# Patient Record
Sex: FEMALE | URBAN - METROPOLITAN AREA
[De-identification: names, ages, dates, MRNs, and addresses within clinical notes are randomized per-mention and may not be internally consistent; named-entity substitution may affect disease eponyms.]

---

## 2019-12-09 ENCOUNTER — HOSPITAL ENCOUNTER (OUTPATIENT)
Dept: MEDSURG UNIT | Facility: HOSPITAL | Age: 34
End: 2019-12-13
Attending: INTERNAL MEDICINE | Admitting: INTERNAL MEDICINE

## 2019-12-09 LAB
ABS NEUT (OLG): 2.58 X10(3)/MCL (ref 2.1–9.2)
ALBUMIN SERPL-MCNC: 3.7 GM/DL (ref 3.4–5)
ALBUMIN/GLOB SERPL: 0.9 RATIO (ref 1–2)
ALP SERPL-CCNC: 82 UNIT/L (ref 45–117)
ALT SERPL-CCNC: 16 UNIT/L (ref 13–56)
APPEARANCE, UA: CLEAR
AST SERPL-CCNC: 19 UNIT/L (ref 15–37)
B-HCG SERPL QL: NEGATIVE
BASOPHILS # BLD AUTO: 0.01 X10(3)/MCL (ref 0–0.2)
BASOPHILS NFR BLD AUTO: 0.2 % (ref 0–1)
BILIRUB SERPL-MCNC: 0.4 MG/DL (ref 0.2–1)
BILIRUB UR QL STRIP: NEGATIVE
BILIRUBIN DIRECT+TOT PNL SERPL-MCNC: 0.11 MG/DL (ref 0–0.2)
BILIRUBIN DIRECT+TOT PNL SERPL-MCNC: 0.29 MG/DL (ref 0–1)
BUN SERPL-MCNC: 13 MG/DL (ref 7–18)
CALCIUM SERPL-MCNC: 8.5 MG/DL (ref 8.5–10.1)
CHLORIDE SERPL-SCNC: 110 MMOL/L (ref 98–107)
CO2 SERPL-SCNC: 24 MMOL/L (ref 21–32)
COLOR UR: YELLOW
CREAT SERPL-MCNC: 0.68 MG/DL (ref 0.55–1.02)
EOSINOPHIL # BLD AUTO: 0.01 X10(3)/MCL (ref 0–0.9)
EOSINOPHIL NFR BLD AUTO: 0.2 % (ref 0–6.4)
ERYTHROCYTE [DISTWIDTH] IN BLOOD BY AUTOMATED COUNT: 12.2 % (ref 11.5–17)
GLOBULIN SER-MCNC: 4 GM/DL (ref 2–4)
GLUCOSE (UA): NEGATIVE
GLUCOSE SERPL-MCNC: 83 MG/DL (ref 74–106)
HCT VFR BLD AUTO: 34 % (ref 37–47)
HGB BLD-MCNC: 12 GM/DL (ref 12–16)
HGB UR QL STRIP: NEGATIVE
IMM GRANULOCYTES # BLD AUTO: 0.01 10*3/UL (ref 0–0.02)
IMM GRANULOCYTES NFR BLD AUTO: 0.2 % (ref 0–0.43)
KETONES UR QL STRIP: NEGATIVE
LEUKOCYTE ESTERASE UR QL STRIP: NEGATIVE
LYMPHOCYTES # BLD AUTO: 1.04 X10(3)/MCL (ref 0.6–4.6)
LYMPHOCYTES NFR BLD AUTO: 25.7 % (ref 16–44)
MCH RBC QN AUTO: 29.1 PG (ref 27–31)
MCHC RBC AUTO-ENTMCNC: 35.3 GM/DL (ref 33–36)
MCV RBC AUTO: 82.5 FL (ref 80–94)
MONOCYTES # BLD AUTO: 0.4 X10(3)/MCL (ref 0.1–1.3)
MONOCYTES NFR BLD AUTO: 9.9 % (ref 4–12.1)
NEUTROPHILS # BLD AUTO: 2.58 X10(3)/MCL (ref 2.1–9.2)
NEUTROPHILS NFR BLD AUTO: 63.8 % (ref 43–73)
NITRITE UR QL STRIP: NEGATIVE
NRBC BLD AUTO-RTO: 0 % (ref 0–0.2)
PH UR STRIP: 7 [PH] (ref 5–7)
PLATELET # BLD AUTO: 168 X10(3)/MCL (ref 130–400)
PMV BLD AUTO: 10.4 FL (ref 7.4–10.4)
POTASSIUM SERPL-SCNC: 3.2 MMOL/L (ref 3.5–5.1)
PROT SERPL-MCNC: 7.4 GM/DL (ref 6.4–8.2)
PROT UR QL STRIP: NEGATIVE
RBC # BLD AUTO: 4.12 X10(6)/MCL (ref 4.2–5.4)
SODIUM SERPL-SCNC: 140 MMOL/L (ref 136–145)
SP GR UR STRIP: 1.02 (ref 1–1.03)
UROBILINOGEN UR STRIP-ACNC: NEGATIVE
WBC # SPEC AUTO: 4 X10(3)/MCL (ref 4.5–11.5)

## 2019-12-10 LAB
ABS NEUT (OLG): 2.01 X10(3)/MCL (ref 2.1–9.2)
ALBUMIN SERPL-MCNC: 3.6 GM/DL (ref 3.4–5)
ALBUMIN/GLOB SERPL: 1.3 {RATIO}
ALP SERPL-CCNC: 73 UNIT/L (ref 38–126)
ALT SERPL-CCNC: 13 UNIT/L (ref 12–78)
AST SERPL-CCNC: 10 UNIT/L (ref 15–37)
BASOPHILS NFR BLD AUTO: 1 %
BILIRUB SERPL-MCNC: 0.4 MG/DL (ref 0.2–1)
BILIRUBIN DIRECT+TOT PNL SERPL-MCNC: 0.1 MG/DL (ref 0–0.2)
BILIRUBIN DIRECT+TOT PNL SERPL-MCNC: 0.3 MG/DL (ref 0–0.8)
BUN SERPL-MCNC: 6 MG/DL (ref 7–18)
CALCIUM SERPL-MCNC: 8.7 MG/DL (ref 8.5–10.1)
CHLORIDE SERPL-SCNC: 110 MMOL/L (ref 98–107)
CO2 SERPL-SCNC: 25 MMOL/L (ref 21–32)
CREAT SERPL-MCNC: 0.5 MG/DL (ref 0.55–1.02)
CRP SERPL HS-MCNC: 0.67 MG/L (ref 0–3)
EOSINOPHIL NFR BLD AUTO: 2 %
ERYTHROCYTE [DISTWIDTH] IN BLOOD BY AUTOMATED COUNT: 12.3 % (ref 11.5–17)
ERYTHROCYTE [SEDIMENTATION RATE] IN BLOOD: 10 MM/HR (ref 0–20)
GLOBULIN SER-MCNC: 2.7 GM/DL (ref 2.4–3.5)
GLUCOSE SERPL-MCNC: 80 MG/DL (ref 74–106)
HCT VFR BLD AUTO: 35.1 % (ref 37–47)
HGB BLD-MCNC: 11.5 GM/DL (ref 12–16)
LIPASE SERPL-CCNC: 57 UNIT/L (ref 73–393)
LYMPHOCYTES NFR BLD AUTO: 26 %
MCH RBC QN AUTO: 28.6 PG (ref 27–31)
MCHC RBC AUTO-ENTMCNC: 32.8 GM/DL (ref 33–36)
MCV RBC AUTO: 87.3 FL (ref 80–94)
MONOCYTES NFR BLD AUTO: 9 %
NEUTROPHILS NFR BLD AUTO: 62 %
PLATELET # BLD AUTO: 171 X10(3)/MCL (ref 130–400)
PMV BLD AUTO: 10.8 FL (ref 7.4–10.4)
POTASSIUM SERPL-SCNC: 4.2 MMOL/L (ref 3.5–5.1)
PROT SERPL-MCNC: 6.3 GM/DL (ref 6.4–8.2)
RBC # BLD AUTO: 4.02 X10(6)/MCL (ref 4.2–5.4)
SODIUM SERPL-SCNC: 140 MMOL/L (ref 136–145)
WBC # SPEC AUTO: 3.2 X10(3)/MCL (ref 4.5–11.5)

## 2019-12-11 LAB
D DIMER PPP IA.FEU-MCNC: 1996 NG/ML FEU (ref 0–500)
TROPONIN I SERPL-MCNC: <0.02 NG/ML (ref 0.02–0.49)

## 2019-12-12 LAB
ABS NEUT (OLG): 2.43 X10(3)/MCL (ref 2.1–9.2)
ALBUMIN SERPL-MCNC: 2.9 GM/DL (ref 3.4–5)
ALBUMIN/GLOB SERPL: 1.1 RATIO (ref 1.1–2)
ALP SERPL-CCNC: 111 UNIT/L (ref 38–126)
ALT SERPL-CCNC: 172 UNIT/L (ref 12–78)
AST SERPL-CCNC: 276 UNIT/L (ref 15–37)
BASOPHILS # BLD AUTO: 0 X10(3)/MCL (ref 0–0.2)
BASOPHILS NFR BLD AUTO: 0 %
BILIRUB SERPL-MCNC: 0.4 MG/DL (ref 0.2–1)
BILIRUBIN DIRECT+TOT PNL SERPL-MCNC: 0.2 MG/DL (ref 0–0.5)
BILIRUBIN DIRECT+TOT PNL SERPL-MCNC: 0.2 MG/DL (ref 0–0.8)
BUN SERPL-MCNC: 4 MG/DL (ref 7–18)
CALCIUM SERPL-MCNC: 8.2 MG/DL (ref 8.5–10.1)
CHLORIDE SERPL-SCNC: 111 MMOL/L (ref 98–107)
CO2 SERPL-SCNC: 24 MMOL/L (ref 21–32)
CREAT SERPL-MCNC: 0.42 MG/DL (ref 0.55–1.02)
EOSINOPHIL # BLD AUTO: 0.1 X10(3)/MCL (ref 0–0.9)
EOSINOPHIL NFR BLD AUTO: 2 %
ERYTHROCYTE [DISTWIDTH] IN BLOOD BY AUTOMATED COUNT: 12.2 % (ref 11.5–17)
GLOBULIN SER-MCNC: 2.7 GM/DL (ref 2.4–3.5)
GLUCOSE SERPL-MCNC: 99 MG/DL (ref 74–106)
HCT VFR BLD AUTO: 30 % (ref 37–47)
HGB BLD-MCNC: 10.2 GM/DL (ref 12–16)
LYMPHOCYTES # BLD AUTO: 0.8 X10(3)/MCL (ref 0.6–4.6)
LYMPHOCYTES NFR BLD AUTO: 21 %
MCH RBC QN AUTO: 29.4 PG (ref 27–31)
MCHC RBC AUTO-ENTMCNC: 34 GM/DL (ref 33–36)
MCV RBC AUTO: 86.5 FL (ref 80–94)
MONOCYTES # BLD AUTO: 0.4 X10(3)/MCL (ref 0.1–1.3)
MONOCYTES NFR BLD AUTO: 10 %
NEUTROPHILS # BLD AUTO: 2.43 X10(3)/MCL (ref 2.1–9.2)
NEUTROPHILS NFR BLD AUTO: 67 %
PLATELET # BLD AUTO: 141 X10(3)/MCL (ref 130–400)
PMV BLD AUTO: 10.7 FL (ref 9.4–12.4)
POTASSIUM SERPL-SCNC: 3.6 MMOL/L (ref 3.5–5.1)
PROT SERPL-MCNC: 5.6 GM/DL (ref 6.4–8.2)
RBC # BLD AUTO: 3.47 X10(6)/MCL (ref 4.2–5.4)
SODIUM SERPL-SCNC: 142 MMOL/L (ref 136–145)
WBC # SPEC AUTO: 3.6 X10(3)/MCL (ref 4.5–11.5)

## 2019-12-13 LAB
ALBUMIN SERPL-MCNC: 2.9 GM/DL (ref 3.4–5)
ALP SERPL-CCNC: 111 UNIT/L (ref 38–126)
ALT SERPL-CCNC: 135 UNIT/L (ref 12–78)
AST SERPL-CCNC: 120 UNIT/L (ref 15–37)
BILIRUB SERPL-MCNC: 0.4 MG/DL (ref 0.2–1)
BILIRUBIN DIRECT+TOT PNL SERPL-MCNC: 0.1 MG/DL (ref 0–0.5)
BILIRUBIN DIRECT+TOT PNL SERPL-MCNC: 0.3 MG/DL (ref 0–0.8)
LIVER PROFILE INTERP: ABNORMAL
PROT SERPL-MCNC: 5.8 GM/DL (ref 6.4–8.2)

## 2022-04-11 ENCOUNTER — HISTORICAL (OUTPATIENT)
Dept: ADMINISTRATIVE | Facility: HOSPITAL | Age: 37
End: 2022-04-11

## 2022-04-27 VITALS
DIASTOLIC BLOOD PRESSURE: 61 MMHG | HEIGHT: 59 IN | WEIGHT: 95.44 LBS | SYSTOLIC BLOOD PRESSURE: 97 MMHG | BODY MASS INDEX: 19.24 KG/M2

## 2022-04-30 NOTE — ED PROVIDER NOTES
Patient:   Mayra Zhang             MRN: [id number]            FIN: [id number]               Age:   34 years     Sex:  Female     :  1985   Associated Diagnoses:   Right flank pain   Author:   Tl Marte MD      Basic Information   History source: Patient.   Arrival mode: Private vehicle.   History limitation: Language barrier.   Additional information: Chief Complaint from Nursing Triage Note : Chief Complaint   2019 8:35 CST       Chief Complaint           pt c/o pain to rlq that radiates to back onset one month ago.  .   Provider/Visit info:   Time Seen:  Tl Marte MD / 2019 08:45  .   History of Present Illness   The patient presents with abdominal pain.  The onset was 4  weeks ago.  The course/duration of symptoms is worsening and fluctuating in intensity.  The character of symptoms is crampy.  The degree at onset was moderate.  The Location of pain at onset was right and flank.  The degree at present is severe.  The Location of pain at present is right and flank.  Radiating pain: none. The exacerbating factor is none.  The relieving factor is none.  Therapy today: none.  Risk factors consist of none.  Associated symptoms: nausea and vomiting.  Additional history: feels similar to prior kidney stones.        Review of Systems   Constitutional symptoms:  Negative except as documented in HPI.   Skin symptoms:  Negative except as documented in HPI.   Eye symptoms:  Negative except as documented in HPI.   ENMT symptoms:  Negative except as documented in HPI.   Respiratory symptoms:  Negative except as documented in HPI.   Cardiovascular symptoms:  Negative except as documented in HPI.   Gastrointestinal symptoms:  Negative except as documented in HPI.   Genitourinary symptoms:  Negative except as documented in HPI.   Musculoskeletal symptoms:  Negative except as documented in HPI.   Neurologic symptoms:  Negative except as documented in HPI.   Psychiatric  symptoms:  Negative except as documented in HPI.   Endocrine symptoms:  Negative except as documented in HPI.   Hematologic/Lymphatic symptoms:  Negative except as documented in HPI.   Allergy/immunologic symptoms:  Negative except as documented in HPI.             Additional review of systems information: All other systems reviewed and otherwise negative.      Health Status   Allergies:    Allergic Reactions (Selected)  No Known Medication Allergies,    Allergies (1) Active Reaction  No Known Medication Allergies None Documented  .   Menstrual history: Last menstrual period: Date 11/3/2019, irregular.      Past Medical/ Family/ Social History   Medical history:    Active  Nephrolithiasis ([id number]).   Surgical history:    No active procedure history items have been selected or recorded., surgery for kidney stones, unspecified hip surgery for congenital abnormality.   Family history: Not significant.   Social history:    Social & Psychosocial Habits    Tobacco  12/09/2019  Use: Never (less than 100 in l    Patient Wants Consult For Cessation Counseling N/A    Abuse/Neglect  12/09/2019  SHX Any signs of abuse or neglect No  .   Problem list:    No qualifying data available  .      Physical Examination               Vital Signs   Vital Signs   12/9/2019 8:35 CST       Temperature Temporal Artery               36.3 DegC                             Peripheral Pulse Rate     92 bpm                             Respiratory Rate          18 br/min                             SpO2                      99 %                             Oxygen Therapy            Room air                             Systolic Blood Pressure   105 mmHg                             Diastolic Blood Pressure  71 mmHg  .   Measurements   12/9/2019 8:35 CST       Weight Dosing             42 kg                             Weight Measured and Calculated in Lbs     92.59 lb                             Weight Estimated          42 kg                              Height/Length Dosing      144.78 cm                             Height/Length Estimated   144.78 cm                             Body Mass Index Estimated 20.04 kg/m2  .   Basic Oxygen Information   12/9/2019 8:35 CST       SpO2                      99 %                             Oxygen Therapy            Room air  .   General:  Alert, no acute distress.    Skin:  Warm, dry, pink.    Head:  Normocephalic, atraumatic.    Neck:  Supple, trachea midline, no tenderness, no JVD, no carotid bruit.    Eye:  Pupils are equal, round and reactive to light, extraocular movements are intact, normal conjunctiva, vision unchanged.    Ears, nose, mouth and throat:  Tympanic membranes clear, oral mucosa moist, no pharyngeal erythema or exudate.    Cardiovascular:  Regular rate and rhythm, No murmur, Normal peripheral perfusion, No edema.    Respiratory:  Lungs are clear to auscultation, respirations are non-labored, breath sounds are equal, Symmetrical chest wall expansion.    Chest wall:  No tenderness, No deformity.    Back:  Nontender, Normal range of motion, Normal alignment.    Musculoskeletal:  Normal ROM, normal strength, no tenderness, no swelling, no deformity.    Gastrointestinal:  Soft, Non distended, No organomegaly, Tenderness: Moderate, right flank, Guarding: Moderate, voluntary, Rebound: Negative, Bowel sounds: Normal.    Neurological:  Alert and oriented to person, place, time, and situation, No focal neurological deficit observed, CN II-XII intact, normal sensory observed, normal motor observed, normal speech observed, normal coordination observed.    Lymphatics:  No lymphadenopathy.   Psychiatric:  Cooperative, appropriate mood & affect, normal judgment.       Medical Decision Making   Orders  Launch Orders   Laboratory:  CMP (Order Processing): Stat collect, 12/9/2019 8:59 CST, Blood, Once, Stop date 12/9/2019 8:59 CST, Lab Collect, Print Label By Order Location, 12/9/2019 8:59 CST  Urinalysis with  Microscopic if Indicated (Order Processing): Stat collect, Urine, 12/9/2019 8:59 CST, Nurse collect  Pregnancy Test Urine (Order Processing): Stat collect, Urine, 12/9/2019 8:59 CST, Once, Stop date 12/9/2019 8:59 CST, Nurse collect, Print Label By Order Location, 12/9/2019 8:59 CST  CBC w/ Auto Diff (Order Processing): Stat collect, 12/9/2019 8:58 CST, Blood, Once, Stop date 12/9/2019 8:58 CST, Lab Collect, 12/9/2019 8:58 CST  Pharmacy:  Zofran 2 mg/mL injectable solution (Order Processing): 4 mg, form: Injection, IV Push, Once, first dose 12/9/2019 8:59 CST, stop date 12/9/2019 8:59 CST, STAT  Sodium Chloride 0.9% 1000mL 1,000 mL (Order Processing): 1,000 mL, 1,000 mL, IV, Bolus, start date 12/9/2019 8:58 CST, 1.3, m2  ketorolac 30 mg for IV Push (Order Processing): 30 mg, form: Injection, IV Push, Once, first dose 12/9/2019 8:58 CST, stop date 12/9/2019 8:58 CST, STAT  Radiology:  CT Abdomen and Pelvis W/O Contrast (Order Processing): Stat, 12/9/2019 8:58 CST, R/O Stones, None, Stretcher, Rad Type, Schedule this test.   Results review:  Lab results : Lab View   12/9/2019 9:30 CST       Sodium Lvl                140 mmol/L                             Potassium Lvl             3.2 mmol/L  LOW                             Chloride                  110 mmol/L  HI                             CO2                       24.0 mmol/L                             Calcium Lvl               8.5 mg/dL                             Glucose Lvl               83 mg/dL                             BUN                       13.0 mg/dL                             Creatinine                0.68 mg/dL                             eGFR-AA                   >60 mL/min/1.73 m2  NA                             eGFR-CHIP                  >60 mL/min/1.73 m2  NA                             Bili Total                0.4 mg/dL                             Bili Direct               0.11 mg/dL                             Bili Indirect             0.29  mg/dL                             AST                       19 unit/L                             ALT                       16 unit/L                             Alk Phos                  82 unit/L                             Total Protein             7.4 gm/dL                             Albumin Lvl               3.7 gm/dL                             Globulin                  4 gm/dL                             A/G Ratio                 0.9 ratio  LOW                             WBC                       4.0 x10(3)/mcL  LOW                             RBC                       4.12 x10(6)/mcL  LOW                             Hgb                       12.0 gm/dL                             Hct                       34.0 %  LOW                             Platelet                  168 x10(3)/mcL                             MCV                       82.5 fL                             MCH                       29.1 pg                             MCHC                      35.3 gm/dL                             RDW                       12.2 %                             MPV                       10.4 fL                             Abs Neut                  2.58 x10(3)/mcL                             Neutro Auto               63.8 %                             Lymph Auto                25.7 %                             Mono Auto                 9.9 %                             Eos Auto                  0.2 %                             Abs Eos                   0.01 x10(3)/mcL                             Basophil Auto             0.2 %                             Abs Neutro                2.58 x10(3)/mcL                             Abs Lymph                 1.04 x10(3)/mcL                             Abs Mono                  0.40 x10(3)/mcL                             Abs Baso                  0.01 x10(3)/mcL                             NRBC%                     0.0 %                             IG%                        0.200 %                             IG#                       0.0100    12/9/2019 8:45 CST       U Beta hCG Ql             Negative                             UA Appear                 CLEAR                             UA Color                  YELLOW                             UA Spec Grav              1.020                             UA Bili                   Negative                             UA pH                     7.0                             UA Urobilinogen           Negative                             UA Blood                  Negative                             UA Glucose                Negative                             UA Ketones                Negative                             UA Protein                Negative                             UA Nitrite                Negative                             UA Leuk Est               Negative  .   Radiology results:  Rad Results (ST)  < 12 hrs   Accession: [id number]  Order: US Abdomen Limited  Report Dt/Tm: 12/09/2019 11:09  Report:   EXAMINATION  US Abdomen Limited     TECHNIQUE  Multiplanar grayscale sonographic evaluation of the right upper  quadrant was performed, with focused Doppler assessment of the main  portal vein.     INDICATION  RUQ Pain     Comparison: There are no available comparisons at the time of exam  interpretation.     FINDINGS  Exam quality: adequate     PANCREAS  No focal abnormality or peripancreatic collection appreciated within  the inherent limitations of sonographic assessment.     LIVER       Size: Within normal limits.       Contour: Unremarkable.       Lesions: No focal abnormality.       Background echogenicity: Unremarkable.     BILE DUCTS       Intrahepatic biliary tree: No abnormal dilatation.       Extrahepatic ducts: Normal caliber through the visualized  proximal and mid portions, with no evidence of intraluminal  abnormality or cause for extrinsic compression.     GALLBLADDER       Lumen: Mildly  distended, with questionable echogenic sludge or  poorly shadowing stones at the gallbladder neck.       Wall: No thickening.       Pericholecystic fluid: None appreciated.          Sonographic Kat sign: Positive.     RIGHT KIDNEY  Shadowing nephrolithiasis is noted. There is no evidence of focal mass  or obstructive uropathy.     VASCULATURE       Portal vein: Normal hepatopedal flow.          Aorta: Unremarkable.       IVC: Unremarkable.     FLUID  No free fluid identified.     IMPRESSION  1.  Questionable gallbladder sludge versus small cholelithiasis.  2.  The positive sonographic Kat's sign may represent sequela of  acute cholecystitis, needing close consideration within the full  clinical context.  3.  Nonobstructing right nephrolithiasis.      Accession: [id number]  Order: CT Abdomen and Pelvis W/O Contrast  Report Dt/Tm: 12/09/2019 10:02  Report:   CT ABDOMEN AND PELVIS WITHOUT CONTRAST:  2-D reformations provided     HISTORY: R/O Stones          As per PQRS measures, all CT scans at this facility used dose  modulation, iterative reconstruction, and/or weight based dose  adjustment when appropriate to reduce radiation dose to as low as  reasonably achievable.     FINDINGS: Kidneys are normal size without hydronephrosis and with  calyceal region calcified nephrolithiasis bilateral ranging in size  punctate up to 5 mm. No hydroureter or additional sizable calcified  urolith identified. Left hip hardware artifact obscures evaluation of  the pelvis with unremarkable urinary bladder as visualized. Right  adnexal cyst likely approximately 2 to 3 cm     Appendix is normal caliber without inflammatory change. No dilated  bowel.     The unenhanced liver, pancreas spleen and adrenal glands demonstrate  no focal or acute pathology as visualized on unenhanced imaging.     IMPRESSION:  Nonobstructive nephrolithiasis      .      Reexamination/ Reevaluation   Vital signs   Basic Oxygen Information   12/9/2019 8:35  CST       SpO2                      99 %                             Oxygen Therapy            Room air        Impression and Plan   Diagnosis   Right flank pain ([id number] R10.9)      Calls-Consults   -  12/9/2019 13:27:00 , Jules DISLA, mariella Parikh, phone call, recommends admit to [facility], surgery consult.    Plan   Condition: Stable.    Disposition: Admit time  12/9/2019 13:28:00, Place in Observation Unit.    Counseled: Patient, Regarding diagnosis, Regarding diagnostic results, Regarding treatment plan, Regarding prescription, Patient indicated understanding of instructions.

## 2022-04-30 NOTE — OP NOTE
Patient:   Mayra Zhang             MRN: [id number]            FIN: [id number]               Age:   34 years     Sex:  Female     :  1985   Associated Diagnoses:   None   Author:   Lynnette Velazquez MD      Brief Operative Note   Operative Information   Date/ Time:  2019 08:33:00.     Preoperative Diagnosis: Acute Cholecystitis.     Postoperative Diagnosis: same.     Procedures Performed: Laparoscopic Cholecystectomy.     Surgeon: Lynnette Velazquez MD.     Assistant: Mary De Gumzan MD Removed: gallbladder.     Esimated blood loss: loss  20  cc.     Description of Procedure/Findings/    Complications: After informed consent was obtained, the patient was taken to the operative suite and general endotracheal anesthesia was induced. The abdomen was prepped and draped in the normal sterile fashion and a time out was called confirming patient and procedure. A 5mm incision was made just above the umbilicus, a veres needle was used to establish peritoneal insufflation at Hurtado's point and an Optiview trocar was used to place the camera into the peritoneum. Once the abdomen was insufflated to 15mmHg, under direct visualization, an 11mm trocar was placed in the epigastric region as well as 2 5mm trocars in the right subcostal region. Using graspers to elevate the fundus of the gallbladder over the liver, the cystic duct and cystic artery were identified and a critical view was obtained. I then doubly clipped and ligated the artery and duct. The gallbladder was then removed from the hepatic bed using hook cautery and removed from the abdomen throught the epigastric 11 mm port site. We inspected our clips, they remained in good position, ensured hemostasis, and removed our trocars under direct visualization. The 11mm port site fascia was closed with 0 Vicryl and the skin of the ports were closed with 4-0 vicryl. Sterile dressings were applied. All sponge and needle counts were  correct at the completion of the procedure. The patient was awakened from general anesthesia, extubated, and taken to the PACU in stable condition. .

## 2022-04-30 NOTE — CONSULTS
Patient:   Mayra Zhang             MRN: [id number]            FIN: [id number]               Age:   34 years     Sex:  Female     :  1985   Associated Diagnoses:   None   Author:   Stuart Alvarenga MD      Consultation Information   RUQ abdominal pain      Basic Information   Admit information:  This is a 34-year-old female with medical history of SLE and migraine headache presented to the ER with chief complaint right upper quadrant abdominal pain associated with nausea and vomiting that has been ongoing for the past 1 month.  Report pain is worse after eating.  He has been using ibuprofen with minimal relief.  Over the last 3 days the pain progressed and was unable to eat or drink without vomiting.  Reports having chills but denies fever.     On arrival to ED, she was hemodynamically stable and afebrile. Labs unremarkable besides K3.2, WBC 4.  CT abdomen and pelvis with contrast show nonobstructive bilateral nephrolithiasis.  Ultrasound abdomen showed mildly distended bladder with questionable echogenic sludge or poorly shadowing stone at the gallbladder neck, with no pericholecystic fluid or wall thickening. General surgery consulted by ED and referred to hospitalist medicine service for further evaluation and management. .    Source of history:  Self.    Present at bedside:  Family member.    Referral source:  Self.    History limitation:  Language barrier.       Chief Complaint   2019 8:35 CST       pt c/o pain to rlq that radiates to back onset one month ago.      History of Present Illness   Ms. Zhang is a 34yoF PMH Migraines, Lupus who presents with one month history of RUQ abdominal pain that radiates to her R back. Pain is associated with nausea and vomiting, also starting one month ago. She states she has SOB and increased pain with vomiting. Pain is stabbing in quality and has remained constant since onset one month ago. No fevers, chills. Last BM was 4 days ago. Has had decreased appetite  since onset of pain due to N/V. Does state she has mild burning with urination       Review of Systems   Respiratory:  Shortness of breath.    Cardiovascular:       Chest pain: when vomiting .    Gastrointestinal:  Nausea, Vomiting, Constipation, No diarrhea.         Abdominal pain: Right, Upper quadrant, The severity is moderate, Characterized as ( Sharp ).    Genitourinary:  Dysuria.    Neurologic:  Alert and oriented X4, Headache, No abnormal balance.       Health Status   Allergies:    Allergic Reactions (All)  No Known Medication Allergies   Current medications:  (Selected)   Inpatient Medications  Ordered  Influenza Virus Vaccine, Inactivated: 0.5 mL, form: Susp, IM, Daily, Order duration: 1 dose(s), first dose 12/10/19 9:00:00 CST, stop date 12/11/19 8:59:00 CST  Norco 5 mg-325 mg oral tablet: 1 tab(s), form: Tab, Oral, q4hr PRN for pain, mild, first dose 12/09/19 22:26:00 CST  Phenergan: 12.5 mg, form: Injection, IM, q4hr PRN for nausea/vomiting, first dose 12/09/19 14:04:00 CST, choose only if unrelieved by Zofran or if ordered alone  Sodium Chloride 0.9% intravenous solution 1,000 mL: 1,000 mL, 1,000 mL, IV, 70 mL/hr, start date 12/09/19 20:52:00 CST, 1.3, m2  Toradol: 15 mg, form: Injection, IV Push, q6hr PRN for pain, moderate, Order duration: 5 day(s), first dose 12/09/19 22:25:00 CST, stop date 12/14/19 22:24:00 CST  Zofran: 4 mg, form: Injection, IV Push, q4hr PRN for nausea, first dose 12/09/19 14:04:00 CST, choose first if ordered with other treatments for nausea  acetaminophen: 650 mg, form: Liquid, Oral, q6hr PRN for fever, first dose 12/09/19 14:04:00 CST, > 38.1 degrees Celsius  hydroxychloroquine: 200 mg, form: Tab, Oral, Daily, first dose 12/10/19 9:00:00 CST  morphine 4 mg/mL preservative-free intravenous solution: 2 mg, form: Injection, IV Push, q4hr PRN for pain, severe, Order duration: 3 day(s), first dose 12/09/19 22:25:00 CST, stop date 12/12/19 22:24:00 CST, ( > 7 on pain  scale)  Documented Medications  Documented  hydroxychloroquine 200 mg oral tablet: 200 mg = 1 tab(s), Oral, Daily  prednisONE 5 mg oral tablet: 25 mg = 5 tab(s), Oral, Daily, # 60 tab(s), 0 Refill(s)   Problem list:    All Problems  Nephrolithiasis / SNOMED CT [id number] / Confirmed      Histories   Past Medical History:    Active  Nephrolithiasis ([id number])   Family History:    No family history items have been selected or recorded.   Procedure history:    No active procedure history items have been selected or recorded.   Social History        Social & Psychosocial Habits    Tobacco  12/09/2019  Use: Never (less than 100 in l    Patient Wants Consult For Cessation Counseling N/A    Abuse/Neglect  12/09/2019  SHX Any signs of abuse or neglect No  .        Physical Examination      Vital Signs (last 24 hrs)_____  Last Charted___________  Temp Oral     36.7 DegC  (DEC 10 04:42)  Heart Rate Peripheral   75 bpm  (DEC 10 04:42)  Resp Rate         16 br/min  (DEC 09 20:26)  SBP      106 mmHg  (DEC 10 04:42)  DBP      72 mmHg  (DEC 10 04:42)  SpO2      100 %  (DEC 09 23:39)  Weight      42 kg  (DEC 09 21:19)  Height      144.78 cm  (DEC 09 21:19)  BMI      20.04  (DEC 09 21:19)     General:  Alert and oriented, No acute distress.    Eye:  Normal, Extraocular movements are intact, Normal conjunctiva.    HENT:  Oral mucosa is moist.    Respiratory:  Respirations are non-labored, Symmetrical chest wall expansion, No chest wall tenderness.    Cardiovascular:  Normal rate.    Gastrointestinal:  Soft, Non-distended, tender to palpation in epigastric area to right upper quadrent. Also tender to R back. .    Genitourinary:  Tenderness to palpation to R upper back .       Review / Management   Results review:     Labs (Last four charted values)  WBC                  L 3.2 (DEC 10) L 4.0 (DEC 09)   Hgb                  L 11.5 (DEC 10) 12.0 (DEC 09)   Hct                  L 35.1 (DEC 10) L 34.0 (DEC 09)   Plt                   171 (DEC 10) 168 (DEC 09)   Na                   140 (DEC 10) 140 (DEC 09)   K                    4.2 (DEC 10) L 3.2 (DEC 09)   CO2                  25.0 (DEC 10) 24.0 (DEC 09)   Cl                   H 110 (DEC 10) H 110 (DEC 09)   Cr                   L 0.50 (DEC 10) 0.68 (DEC 09)   BUN                  L 6.0 (DEC 10) 13.0 (DEC 09)   Glucose Random       80 (DEC 10) 83 (DEC 09) .       Impression and Plan   34yoF PMH lupus who presents with 1mo history of N/V, RUQ abdominal pain with RUQ US with stones vs sludge    - NPO, IVF midnight  - Agree with current management  - Plan for OR 12/11 for lap vs open cholecystectomy   - Booked, consented    Stuart Alvarenga, PGY-1  [facility] [facility] Surgery

## 2022-04-30 NOTE — PROGRESS NOTES
Patient:   Mayra Zhang             MRN: [id number]            FIN: [id number]               Age:   34 years     Sex:  Female     :  1985   Associated Diagnoses:   None   Author:   Dania MANZO, [facility]      HX-  Admit information:  Ms. Zhang is a 34yoF PMH Migraines, Lupus who presents with one month history of RUQ abdominal pain that radiates to her R back. Pain is associated with nausea and vomiting, also starting one month ago. She states she has SOB and increased pain with vomiting. Pain is stabbing in quality and has remained constant since onset one month ago. No fevers, chills. Last BM was 4 days ago. Has had decreased appetite since onset of pain due to N/V. Does state she has mild burning with urination .              Patient presents for the post-op follow up after CHOLECYSTECTOMY:  The patient c/o of constipation. Eating good. Denies fever, sob, ab pain.      PE-  VSS AF  ABD- soft non tender, non distended wound is clean, dry and intact.      Path  GALLBLADDER, CHOLECYSTECTOMY:    CHRONIC CHOLECYSTITIS            A/P-  The is doing good.   Fu appointment for LUPUS in  with the Doctor in [place].        F/U prn           Neetu Najera  [employer] [facility]

## 2022-05-04 NOTE — HISTORICAL [facility] CERNER
This is a historical note converted from [place]. Formatting and pictures may have been removed.  Please reference Karine for original formatting and attached multimedia. Chief Complaint  RUQ abdominal pain, nausea and vomiting  History of Present Illness  This is a 34-year-old female with medical history of SLE and migraine headache?presented to the ER with chief complaint?right upper quadrant abdominal pain?associated with nausea and vomiting?that has been?ongoing for the past 1 month. ?Report pain?is worse?after eating.??He has been using?ibuprofen with minimal relief. ?Over the last 3 days the pain?progressed and was unable?to eat or drink without vomiting.??Reports having chills but denies fever.  ?  On arrival to ED,?she was hemodynamically stable and afebrile. Labs unremarkable besides K3.2, WBC 4.? CT abdomen and pelvis with contrast show nonobstructive bilateral nephrolithiasis.? Ultrasound abdomen showed mildly distended bladder with questionable echogenic sludge or poorly shadowing stone at the gallbladder neck, with no pericholecystic fluid or wall thickening.?General surgery consulted by ED and referred to hospitalist medicine service for further evaluation and management.  Review of Systems  Except as documented, all other systems reviewed and are negative.  Physical Exam  Vitals & Measurements  T:?36.8? ?C (Oral)? TMIN:?36.3? ?C (Temporal Artery)? TMAX:?36.8? ?C (Oral)? HR:?86(Peripheral)? RR:?16? BP:?104/70? SpO2:?99%? WT:?42?kg? BMI:?20.04?  General: appears comfortable  HEENT:?NC/AT  Neck:?no JVD, trachea at?midline  Chest:?CTABL, no rales or rhonchi, no accessory muscle use  CVS:?regular rhythm. Normal S1/S2. No gallops, murmurs or rub. no edema  Abdomen:?nondistended, normoactive bowel sound, soft, RUQ and epigastric tenderness  Extremities:?no clubbing or cyanosis  Skin:?warm and dry  Neuro:?AAOx3, no focal neurological deficit  Psych:?cooperative  Assessment/Plan  RUQ Pain - DDX: Biliary colic,  chololithiasis/sludge Vs Biliary dyskinesia  Hypokalemia  HX Lupus on Plaquenil  ?  Plan:  ?  - Physical exam suggest cholecystitis although labs and imaging do not. Will hold on antibiotics for now.  - HIDA scan  - Clears, NPO after midnight  - PRN analgesics?and antiemetics  - Replace K  - NS at 70 ml/hr  - Surgical hospitalist consult  - Resume Hydroxychloroquine  - Check ESR, CRP  ?  VTE PPX: SCD  Code status: Full  PCP: Bret Wall MD   Problem List/Past Medical History  Systemic lupus erythematosus- DX 13 years ago  Migraine  Procedure/Surgical History  Left hip arthroplasty  Medications  Inpatient  acetaminophen, 650 mg= 20.3 mL, Oral, q6hr, PRN  cloNIDine, 0.2 mg= 2 tab(s), Oral, q8hr, PRN  Influenza Virus Vaccine, Inactivated, 0.5 mL, IM, Daily  morphine, 4 mg= 1 mL, IV, q2hr, PRN  Percocet 5/325, 1 tab(s), Oral, q6hr, PRN  Phenergan, 12.5 mg= 0.5 mL, IM, q4hr, PRN  Sodium Chloride 0.9% 1000mL 1,000 mL, 1000 mL, IV  Sodium Chloride 0.9% intravenous solution 1,000 mL, 1000 mL, IV  Toradol, 30 mg= 1 mL, IV Push, q6hr, PRN  Zofran, 4 mg= 2 mL, IV Push, q4hr, PRN  Home  hydroxychloroquine 200 mg oral tablet, 200 mg= 1 tab(s), Oral, Daily  Allergies  No Known Medication Allergies  Social History  No smoking alcohol or illicit drugs  Family History  Reviewed and negative  Lab Results  Labs Last 24 Hours?  ?Chemistry? Hematology/Coagulation?   Sodium Lvl: 140 mmol/L (12/09/19 09:30:00) WBC:?4 x10(3)/mcL?Low (12/09/19 09:30:00)   Potassium Lvl:?3.2 mmol/L?Low (12/09/19 09:30:00) RBC:?4.12 x10(6)/mcL?Low (12/09/19 09:30:00)   Chloride:?110 mmol/L?High (12/09/19 09:30:00) Hgb: 12 gm/dL (12/09/19 09:30:00)   CO2: 24 mmol/L (12/09/19 09:30:00) Hct:?34 %?Low (12/09/19 09:30:00)   Calcium Lvl: 8.5 mg/dL (12/09/19 09:30:00) Platelet: 168 x10(3)/mcL (12/09/19 09:30:00)   Glucose Lvl: 83 mg/dL (12/09/19 09:30:00) MCV: 82.5 fL (12/09/19 09:30:00)   BUN: 13 mg/dL (12/09/19 09:30:00) MCH: 29.1 pg (12/09/19 09:30:00)    Creatinine: 0.68 mg/dL (12/09/19 09:30:00) MCHC: 35.3 gm/dL (12/09/19 09:30:00)   eGFR-AA: >60 (12/09/19 09:30:00) RDW: 12.2 % (12/09/19 09:30:00)   eGFR-CHIP: >60 (12/09/19 09:30:00) MPV: 10.4 fL (12/09/19 09:30:00)   Bili Total: 0.4 mg/dL (12/09/19 09:30:00) Abs Neut: 2.58 x10(3)/mcL (12/09/19 09:30:00)   Bili Direct: 0.11 mg/dL (12/09/19 09:30:00) Neutro Auto: 63.8 % (12/09/19 09:30:00)   Bili Indirect: 0.29 mg/dL (12/09/19 09:30:00) Lymph Auto: 25.7 % (12/09/19 09:30:00)   AST: 19 unit/L (12/09/19 09:30:00) Mono Auto: 9.9 % (12/09/19 09:30:00)   ALT: 16 unit/L (12/09/19 09:30:00) Eos Auto: 0.2 % (12/09/19 09:30:00)   Alk Phos: 82 unit/L (12/09/19 09:30:00) Abs Eos: 0.01 x10(3)/mcL (12/09/19 09:30:00)   Total Protein: 7.4 gm/dL (12/09/19 09:30:00) Basophil Auto: 0.2 % (12/09/19 09:30:00)   Albumin Lvl: 3.7 gm/dL (12/09/19 09:30:00) Abs Neutro: 2.58 x10(3)/mcL (12/09/19 09:30:00)   Globulin: 4 gm/dL (12/09/19 09:30:00) Abs Lymph: 1.04 x10(3)/mcL (12/09/19 09:30:00)   A/G Ratio:?0.9 ratio?Low (12/09/19 09:30:00) Abs Mono: 0.4 x10(3)/mcL (12/09/19 09:30:00)   U Beta hCG Ql: Negative (12/09/19 08:45:00) Abs Baso: 0.01 x10(3)/mcL (12/09/19 09:30:00)    NRBC%: 0.0 (12/09/19 09:30:00)    IG%: 0.2 % (12/09/19 09:30:00)    IG#: 0.01 (12/09/19 09:30:00)   Diagnostic Results  Accession:?[id number]  Order:?US Abdomen Limited  Report Dt/Tm:?12/09/2019 11:09  Report:?  EXAMINATION  US Abdomen Limited  ?  TECHNIQUE  Multiplanar grayscale sonographic evaluation of the right upper  quadrant was performed, with focused Doppler assessment of the main  portal vein.  ?  INDICATION  RUQ Pain  ?  Comparison: There are no available comparisons at the time of exam  interpretation.  ?  FINDINGS  Exam quality: adequate  ?  PANCREAS  No focal abnormality or peripancreatic collection appreciated within  the inherent limitations of sonographic assessment.  ?  LIVER  ?? ? Size: Within normal limits.  ?? ? Contour: Unremarkable.  ?? ?  Lesions: No focal abnormality.  ?? ? Background echogenicity: Unremarkable.  ?  BILE DUCTS  ?? ? Intrahepatic biliary tree: No abnormal dilatation.  ?? ? Extrahepatic ducts: Normal caliber through the visualized  proximal and mid portions, with no evidence of intraluminal  abnormality or cause for extrinsic compression.  ?  GALLBLADDER  ?? ? Lumen: Mildly distended, with questionable echogenic sludge or  poorly shadowing stones at the gallbladder neck.  ?? ? Wall: No thickening.  ?? ? Pericholecystic fluid: None appreciated.  ?  ?? ? Sonographic Kat sign: Positive.  ?  RIGHT KIDNEY  Shadowing nephrolithiasis is noted. There is no evidence of focal mass  or obstructive uropathy.  ?  VASCULATURE  ?? ? Portal vein: Normal hepatopedal flow.  ?  ?? ? Aorta: Unremarkable.  ?? ? IVC: Unremarkable.  ?  FLUID  No free fluid identified.  ?  IMPRESSION  1. ?Questionable gallbladder sludge versus small cholelithiasis.  2. ?The positive sonographic [place] sign may represent sequela of  acute cholecystitis, needing close consideration within the full  clinical context.  3. ?Nonobstructing right nephrolithiasis.  ?  ?  Accession:?[id number]  Order:?CT Abdomen and Pelvis W/O Contrast  Report Dt/Tm:?12/09/2019 10:02  Report:?  CT ABDOMEN AND PELVIS WITHOUT CONTRAST:  2-D reformations provided  ?  HISTORY: R/O Stones ? ??  ?  As per PQRS measures, all CT scans at this facility used dose  modulation, iterative reconstruction, and/or weight based dose  adjustment when appropriate to reduce radiation dose to as low as  reasonably achievable.  ?  FINDINGS: Kidneys are normal size without hydronephrosis and with  calyceal region calcified nephrolithiasis bilateral ranging in size  punctate up to 5 mm. No hydroureter or additional sizable calcified  urolith identified. Left hip hardware artifact obscures evaluation of  the pelvis with unremarkable urinary bladder as visualized. Right  adnexal cyst likely approximately 2 to 3  cm  ?  Appendix is normal caliber without inflammatory change. No dilated  bowel.  ?  The unenhanced liver, pancreas spleen and adrenal glands demonstrate  no focal or acute pathology as visualized on unenhanced imaging.  ?  IMPRESSION:  Nonobstructive nephrolithiasis